# Patient Record
Sex: MALE | Race: WHITE
[De-identification: names, ages, dates, MRNs, and addresses within clinical notes are randomized per-mention and may not be internally consistent; named-entity substitution may affect disease eponyms.]

---

## 2021-09-30 ENCOUNTER — HOSPITAL ENCOUNTER (EMERGENCY)
Dept: HOSPITAL 54 - ER | Age: 31
Discharge: HOME | End: 2021-09-30
Payer: SELF-PAY

## 2021-09-30 VITALS — SYSTOLIC BLOOD PRESSURE: 124 MMHG | DIASTOLIC BLOOD PRESSURE: 74 MMHG

## 2021-09-30 VITALS — WEIGHT: 220 LBS | HEIGHT: 74 IN | BODY MASS INDEX: 28.23 KG/M2

## 2021-09-30 DIAGNOSIS — J02.0: Primary | ICD-10-CM
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
## 2021-09-30 DIAGNOSIS — F10.10: ICD-10-CM

## 2021-09-30 DIAGNOSIS — R59.0: ICD-10-CM

## 2021-09-30 DIAGNOSIS — F17.200: ICD-10-CM

## 2021-09-30 PROCEDURE — 99284 EMERGENCY DEPT VISIT MOD MDM: CPT

## 2021-09-30 PROCEDURE — 96372 THER/PROPH/DIAG INJ SC/IM: CPT

## 2021-09-30 RX ADMIN — DEXAMETHASONE SODIUM PHOSPHATE ONE MG: 10 INJECTION INTRAMUSCULAR; INTRAVENOUS at 08:06

## 2021-09-30 RX ADMIN — KETOROLAC TROMETHAMINE ONE MG: 30 INJECTION, SOLUTION INTRAMUSCULAR at 08:06

## 2021-09-30 NOTE — NUR
TO ER BED 7, C/O SORE THROAT SINCE LAST NIGHT WITH ON AND OFF FEVER AND CHILLS, 
AFEBRILE UPON ER ARRIVAL, [id number], BREATHING EVEN AND NON LABORED, ATTACHED TO 
MONITOR, AWAITING MD TREVIÑO